# Patient Record
Sex: MALE | URBAN - METROPOLITAN AREA
[De-identification: names, ages, dates, MRNs, and addresses within clinical notes are randomized per-mention and may not be internally consistent; named-entity substitution may affect disease eponyms.]

---

## 2022-06-23 ENCOUNTER — TELEPHONE (OUTPATIENT)
Dept: NEUROSURGERY | Facility: CLINIC | Age: 37
End: 2022-06-23

## 2022-06-23 NOTE — TELEPHONE ENCOUNTER
[facility] Call Center    Phone Message    May a detailed message be left on voicemail: yes     Reason for Call: Other: Patient is requesting a call back from Dr. Nuñez regarding a procedure Dr. Nuñez performed on him in [id number] (Dysectomy L5S1 Hernia Disc). Patient states he re injured his back and would like to know what steps he should take. Patient is currently having Lumbar injections yearly. Please call patient back to advise at # [phone]    Action Taken: Message routed to:  [facility] ([facility]): Neurosurgery     Travel Screening: Not Applicable

## 2022-06-24 NOTE — TELEPHONE ENCOUNTER
Contacted patient and communicated that Dr. Nuñez no longer has a [place] license so he cannot do a virtual visit.    Shared that Dr. Nuñez recommends he reach out to his former partner, Dr. Mahajan.    Sally De La Cruz